# Patient Record
Sex: FEMALE | Race: WHITE
[De-identification: names, ages, dates, MRNs, and addresses within clinical notes are randomized per-mention and may not be internally consistent; named-entity substitution may affect disease eponyms.]

---

## 2019-08-30 ENCOUNTER — HOSPITAL ENCOUNTER (EMERGENCY)
Dept: HOSPITAL 96 - M.ERS | Age: 39
Discharge: HOME | End: 2019-08-30
Payer: COMMERCIAL

## 2019-08-30 VITALS — BODY MASS INDEX: 28.7 KG/M2 | HEIGHT: 71 IN | WEIGHT: 205.01 LBS

## 2019-08-30 VITALS — SYSTOLIC BLOOD PRESSURE: 123 MMHG | DIASTOLIC BLOOD PRESSURE: 77 MMHG

## 2019-08-30 DIAGNOSIS — R05: Primary | ICD-10-CM

## 2020-02-16 ENCOUNTER — HOSPITAL ENCOUNTER (INPATIENT)
Dept: HOSPITAL 96 - M.ERS | Age: 40
LOS: 3 days | Discharge: PSYCHIATRIC OR SUBSTANCE USE FACILITY | DRG: 917 | End: 2020-02-19
Attending: INTERNAL MEDICINE | Admitting: INTERNAL MEDICINE
Payer: COMMERCIAL

## 2020-02-16 VITALS — BODY MASS INDEX: 28.98 KG/M2 | HEIGHT: 71 IN | WEIGHT: 207 LBS

## 2020-02-16 VITALS — SYSTOLIC BLOOD PRESSURE: 141 MMHG | DIASTOLIC BLOOD PRESSURE: 58 MMHG

## 2020-02-16 VITALS — DIASTOLIC BLOOD PRESSURE: 97 MMHG | SYSTOLIC BLOOD PRESSURE: 147 MMHG

## 2020-02-16 DIAGNOSIS — R45.851: ICD-10-CM

## 2020-02-16 DIAGNOSIS — T42.6X2A: ICD-10-CM

## 2020-02-16 DIAGNOSIS — F17.210: ICD-10-CM

## 2020-02-16 DIAGNOSIS — F12.90: ICD-10-CM
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
## 2020-02-16 DIAGNOSIS — T42.4X2A: ICD-10-CM

## 2020-02-16 DIAGNOSIS — G92: ICD-10-CM

## 2020-02-16 DIAGNOSIS — F32.9: ICD-10-CM

## 2020-02-16 DIAGNOSIS — Z79.899: ICD-10-CM

## 2020-02-16 DIAGNOSIS — T39.1X2A: Primary | ICD-10-CM

## 2020-02-16 DIAGNOSIS — Z28.21: ICD-10-CM

## 2020-02-16 DIAGNOSIS — J69.0: ICD-10-CM

## 2020-02-16 LAB
ABSOLUTE BASOPHILS: 0 THOU/UL (ref 0–0.2)
ABSOLUTE EOSINOPHILS: 0 THOU/UL (ref 0–0.7)
ABSOLUTE MONOCYTES: 0.5 THOU/UL (ref 0–1.2)
ALBUMIN SERPL-MCNC: 4.1 G/DL (ref 3.4–5)
ALP SERPL-CCNC: 75 U/L (ref 46–116)
ALT SERPL-CCNC: 14 U/L (ref 30–65)
ANION GAP SERPL CALC-SCNC: 10 MMOL/L (ref 7–16)
APAP SERPL-MCNC: 109 UG/ML (ref 10–30)
APTT BLD: 24.5 SECONDS (ref 25–31.3)
AST SERPL-CCNC: 10 U/L (ref 15–37)
BASOPHILS NFR BLD AUTO: 0.3 %
BENZODIAZ UR-MCNC: POSITIVE UG/L
BILIRUB SERPL-MCNC: 0.5 MG/DL
BILIRUB UR-MCNC: NEGATIVE MG/DL
BUN SERPL-MCNC: 9 MG/DL (ref 7–18)
CALCIUM SERPL-MCNC: 8.5 MG/DL (ref 8.5–10.1)
CHLORIDE SERPL-SCNC: 104 MMOL/L (ref 98–107)
CO2 SERPL-SCNC: 25 MMOL/L (ref 21–32)
COLOR UR: YELLOW
CREAT SERPL-MCNC: 0.8 MG/DL (ref 0.6–1.3)
EOSINOPHIL NFR BLD: 0.4 %
ETHANOL SERPL-MCNC: < 10 MG/DL (ref ?–10)
GLUCOSE SERPL-MCNC: 90 MG/DL (ref 70–99)
GRANULOCYTES NFR BLD MANUAL: 84.6 %
HCT VFR BLD CALC: 42.4 % (ref 37–47)
HGB BLD-MCNC: 14.7 GM/DL (ref 12–15)
INR PPP: 1
KETONES UR STRIP-MCNC: NEGATIVE MG/DL
LYMPHOCYTES # BLD: 1.1 THOU/UL (ref 0.8–5.3)
LYMPHOCYTES NFR BLD AUTO: 10.4 %
MCH RBC QN AUTO: 32.2 PG (ref 26–34)
MCHC RBC AUTO-ENTMCNC: 34.7 G/DL (ref 28–37)
MCV RBC: 92.7 FL (ref 80–100)
MONOCYTES NFR BLD: 4.3 %
MPV: 9 FL. (ref 7.2–11.1)
NEUTROPHILS # BLD: 9.1 THOU/UL (ref 1.6–8.1)
NUCLEATED RBCS: 0 /100WBC
PLATELET COUNT*: 242 THOU/UL (ref 150–400)
POTASSIUM SERPL-SCNC: 4.5 MMOL/L (ref 3.5–5.1)
PROT SERPL-MCNC: 7.6 G/DL (ref 6.4–8.2)
PROT UR QL STRIP: (no result)
PROTHROMBIN TIME: 10 SECONDS (ref 9.2–11.5)
RBC # BLD AUTO: 4.57 MIL/UL (ref 4.2–5)
RBC # UR STRIP: NEGATIVE /UL
RDW-CV: 13.7 % (ref 10.5–14.5)
SALICYLATES SERPL-MCNC: 5.8 MG/DL (ref 2.8–20)
SODIUM SERPL-SCNC: 139 MMOL/L (ref 136–145)
SP GR UR STRIP: >= 1.03 (ref 1–1.03)
THC: POSITIVE
URINE CLARITY: CLEAR
URINE GLUCOSE-RANDOM: NEGATIVE
URINE LEUKOCYTES-REFLEX: NEGATIVE
URINE NITRITE-REFLEX: NEGATIVE
UROBILINOGEN UR STRIP-ACNC: 0.2 E.U./DL (ref 0.2–1)
WBC # BLD AUTO: 10.8 THOU/UL (ref 4–11)

## 2020-02-16 NOTE — CON
[address]  [address]                    CONSULTATION                  
_______________________________________________________________________________
 
Name:       ADAMSALEJANDRA M                 Room:           [address] [place]  
M.R.#:  [id number]      Account #:      [id number]  
Admission:  02/16/20     Attend Phys:    DENNIS Torres
Discharge:               Date of Birth:  [birth year]  
         Report #: [id number]
                                                                     [id number]  
_______________________________________________________________________________
THIS REPORT FOR:  //name//                      
 
cc:  RODRIGO Esparza family physician/PCP 
     RODRIGO Esparza family physician/PCP                                        ~
THIS REPORT FOR:  //name//                      
 
CC: Dr. Gant 
    [facility] physician/PCP
    Pee Arthur
 
DICTATED BY: Zakiya Mesa [facility]
 
DATE OF SERVICE:  02/17/2020
 
 
Please note at the time of this dictation, the patient was seen and physically
examined by myself.
 
REASON FOR CONSULTATION:  Tylenol overdose.
 
HISTORY OF PRESENT ILLNESS:  This is a 39-year-old female who presented to the
Emergency Room with noting that she had taken ten 1 mg lorazepam and three 1 mg
of Ambien that she mumbling to herself that she wanted to be closer to her
daughter who committed suicide on 12/18/2019.  The patient denied any alcohol
use that day, but has had a previous history of alcohol abuse and had been
attending AA.  She denied any overdose on Tylenol; however, when they did an
acetaminophen level on admission, she was 132 and she is continuing to go down. 
The patient is very vague in her responses and most of her H and P is obtained
from her chart.  The patient is very slurring of her words and hard to follow
and does fall asleep throughout.  The patient does mention that her PCP is Dr. Gant.
 
ALLERGIES:  No known drug allergies.
 
MEDICATIONS:  It appears medications from home have been clonazepam and Ambien
as well as some lorazepam.
 
PAST SURGICAL HISTORY:  Negative.
 
FAMILY HISTORY:  Negative.
 
SOCIAL HISTORY:  No recent but previous history of alcohol abuse.  The patient
has been going to .
 
REVIEW OF SYSTEMS:  A 12-point review of systems is essentially negative except
what is mentioned in the HPI.
 
 
 
[place] [address]                    CONSULTATION                  
_______________________________________________________________________________
 
Name:       LAMASALEJANDRA M                 Room:           [address] [place]  
[facility].#:  [id number]      Account #:      [id number]  
Admission:  02/16/20     Attend Phys:    DENNIS Torres
Discharge:               Date of Birth:  [birth year]  
         Report #: [id number]
                                                                     [id number]  
_______________________________________________________________________________
 
PHYSICAL EXAMINATION:
VITAL SIGNS:  Temperature 36.6, pulse 74, respirations 22, blood pressure
138/78.
HEART:  Regular rate and rhythm.
LUNGS:  Diminished, but clear.
ABDOMEN:  Soft, positive bowel sounds in all 4 quadrants with no masses or
tenderness noted.
 
LABORATORY DATA:  Hemoglobin is 14.7, white count is 10.8, platelets 242,000. 
PT 10, INR 1.0.  Total bilirubin 0.5, alkaline phosphatase is 75, ALT 14, AST is
10.  She was positive for benzos and acetaminophen and marijuana.
 
IMPRESSION:
1.  Intentional Tylenol overdose along with Ambien and lorazepam.
2.  Suicidal ideation.
 
PLAN:
1.  Continue her acetylcysteine.
2.  Monitor her LFTs.
3.  Further recommendations to be made once Dr. Rico sees the patient later
today.
 
Thank you for allowing us to participate in this patient's care.  Please do not
hesitate to call with any questions in regard to this consult.
 
 
 
 
 
 
 
 
 
 
 
 
 
 
 
 
 
 
 
                       
                                        By:                                
                 
D: 02/17/20 1248_______________________________________
T: 02/18/20 0500Roger Rico DO             /nt

## 2020-02-17 VITALS — DIASTOLIC BLOOD PRESSURE: 73 MMHG | SYSTOLIC BLOOD PRESSURE: 135 MMHG

## 2020-02-17 VITALS — SYSTOLIC BLOOD PRESSURE: 128 MMHG | DIASTOLIC BLOOD PRESSURE: 71 MMHG

## 2020-02-17 VITALS — DIASTOLIC BLOOD PRESSURE: 58 MMHG | SYSTOLIC BLOOD PRESSURE: 104 MMHG

## 2020-02-17 VITALS — SYSTOLIC BLOOD PRESSURE: 132 MMHG | DIASTOLIC BLOOD PRESSURE: 99 MMHG

## 2020-02-17 VITALS — SYSTOLIC BLOOD PRESSURE: 155 MMHG | DIASTOLIC BLOOD PRESSURE: 96 MMHG

## 2020-02-17 VITALS — SYSTOLIC BLOOD PRESSURE: 127 MMHG | DIASTOLIC BLOOD PRESSURE: 90 MMHG

## 2020-02-17 VITALS — SYSTOLIC BLOOD PRESSURE: 142 MMHG | DIASTOLIC BLOOD PRESSURE: 85 MMHG

## 2020-02-17 VITALS — DIASTOLIC BLOOD PRESSURE: 78 MMHG | SYSTOLIC BLOOD PRESSURE: 138 MMHG

## 2020-02-17 VITALS — SYSTOLIC BLOOD PRESSURE: 132 MMHG | DIASTOLIC BLOOD PRESSURE: 67 MMHG

## 2020-02-17 VITALS — SYSTOLIC BLOOD PRESSURE: 149 MMHG | DIASTOLIC BLOOD PRESSURE: 90 MMHG

## 2020-02-17 LAB
ALBUMIN SERPL-MCNC: 3.5 G/DL (ref 3.4–5)
ALP SERPL-CCNC: 63 U/L (ref 46–116)
ALT SERPL-CCNC: 12 U/L (ref 30–65)
ALT SERPL-CCNC: 13 U/L (ref 30–65)
ANION GAP SERPL CALC-SCNC: 14 MMOL/L (ref 7–16)
AST SERPL-CCNC: 6 U/L (ref 15–37)
AST SERPL-CCNC: 7 U/L (ref 15–37)
BILIRUB DIRECT SERPL-MCNC: 0.1 MG/DL
BILIRUB SERPL-MCNC: 0.7 MG/DL
BUN SERPL-MCNC: 6 MG/DL (ref 7–18)
CALCIUM SERPL-MCNC: 8.4 MG/DL (ref 8.5–10.1)
CHLORIDE SERPL-SCNC: 107 MMOL/L (ref 98–107)
CO2 SERPL-SCNC: 20 MMOL/L (ref 21–32)
CREAT SERPL-MCNC: 0.7 MG/DL (ref 0.6–1.3)
GLUCOSE SERPL-MCNC: 87 MG/DL (ref 70–99)
HCT VFR BLD CALC: 39.7 % (ref 37–47)
HGB BLD-MCNC: 13.6 GM/DL (ref 12–15)
INR PPP: 1.1
POTASSIUM SERPL-SCNC: 3.4 MMOL/L (ref 3.5–5.1)
PROT SERPL-MCNC: 6.9 G/DL (ref 6.4–8.2)
PROTHROMBIN TIME: 11.4 SECONDS (ref 9.2–11.5)
SODIUM SERPL-SCNC: 141 MMOL/L (ref 136–145)

## 2020-02-17 NOTE — NUR
RECEIEVIED REPORT FROM SHELDON RN IN ICU OF EXPECTED TRANSFER- DX: SI, TYLENOL
OVERDOSE- PT ARRIED TO ROOM 226 VIA W/C PER TECH AT 1300- CARDIAC MONITOR
PLACED AS ORDERED, TRACING SR WITH 1ST DEGREE-ROOM PREPED AS INDICATED AND
SITTER AT SIDE-PT NOTED TO BE A&O X3-4, WITH SLURRED SPEACH- PRIOR ASSESSMENT
REVIEWED AND THIS NURSE AGREES- IV NOTED TO LEFT AC AND LEFT WRIST INTACT, IV
ACETYLCYSTEINE INFUSSING AS PRESICBED- PRESCIBED ZOYSN GIVEN AS PRESCIBED AS
WELL- PT NOTED TO BE AGGRUMENTATIVE AT TIMES AND QUESTIONING IF ANYONE HAS
EVER ESCAPED FROM HERE BEFORE- EXPRESSES CONCERNS FOR LOSING JOB IF SHE
DOSEN'T GET OUT OF HERE- PT THEN LATER NOTED TO PULL OUT LEFT AC IV, STATING
AGAIN THAT SHE WANTED TO LEAVE- CALL LIGHT AND PERSONAL BELONGINGS WITH IN
REACH- HOURLY ROUNDS IN PLACE R/T SAFETY/NEEDS- ALL NEEDS MET AT THIS
TIME-WCTM

## 2020-02-17 NOTE — NUR
RECEIVED REPORT AND ASSUMED CARE AT 2320. PT TRANSPORTED FROM ED TO ICU BED
02. VSS. ICU MONITORING IN PLACEE. ASSESSMENT AND ADMISSION COMPLETED AS
CHARTED. PT IS ABLE TO FOLLOW SOME VERBAL COOMANDS, RESPONDS TO TOUCH AND
NAME. NOT RESPONDING VERBALLY, UNABLE TO TRACE WITH EYES. PT HAS 1:1 FOR
SAFETY. ON ACETAMINOPHEN OVERDOSE POTOCOL. PT VOMITING UPON ARRIVAL. PRN
MEDICATION PER ORDERS.
LATER WHEN ABLE TO RESPOND. PT REPORTED THIS WAS JUST A "ONE TIME ATTEMPT". PT
DOES NOT REMEMBER TAKING ANY ACETAMINOPHEN. PICS TAKEN OF NOTES WRITTE ON PT
ARM SAYS  "2020 JAN 16 IM DONE" AND SIGNED HER NAME AND UNDER THAT "IM A DNR"
BED LOCKED IN LOWEST POSITION, CALL LIGHT WITHIN REACH.BED ALARM ON.

## 2020-02-17 NOTE — EKG
[place] [address]
Phone:  [phone]                     ELECTROCARDIOGRAM REPORT      
_______________________________________________________________________________
 
Name:         ALEJANDRA LAMAS                Room:          [address] [place] 
..#:    [id number]     Account #:     [id number]  
Admission:    20    Attend Phys:   Pee Arthur
Discharge:                Date of Birth: [birth year]  
Date of Service: 20  Report #:      [id number]
        [id number]
_______________________________________________________________________________
THIS REPORT FOR:  //name//                      
 
                         [facility] ED
                                       
Test Date:    2020               Test Time:    20:07:43
Pat Name:     ALEJANDRA LAMAS             Department:   
Patient ID:   [id number]            Room:         [facility]
Gender:       F                        Technician:   MR
:          1980               Requested By: José Manuel Barnes
Order Number: [id number] MD:   Lambert Link
                                 Measurements
Intervals                              Axis          
Rate:         61                       P:            62
VT:           194                      QRS:          44
QRSD:         109                      T:            37
QT:           438                                    
QTc:          442                                    
                           Interpretive Statements
Sinus arrhythmia
RSR' in V1 or V2, right VCD 
No previous ECG available for comparison
 
Electronically Signed On 2020 16:46:30 CST by Lambert Link
[email or web address]
 
 
 
 
 
 
 
 
 
 
 
 
 
 
 
 
 
 
 
 
  <ELECTRONICALLY SIGNED>
                                           By: Lambert Link MD, [facility]     
  20     1646
D: 20   _____________________________________
T: 20   Lambert Link MD, [facility]       /EPI

## 2020-02-17 NOTE — NUR
PT TRANFERED TO ROOM 226. REPORT GIVEN TO RAFA SANTIAGO. PT BECAME UPSET AND
ARGUMENTIVE R/T ROOM CHANGE. BELIEVED "DR CORTEZ" WAS SENDING HER TO THE "PSYCH
FLOOR". EXPLAINED TO PT THAT SHE WAS LEAVING ICU TO GO TO A DIFFERENT LEVEL OF
CARE AND THAT IT WAS NOT A PSYCH FLOOR. CHELSEY GONSALEZ, AND NELLIE FROM [facility]
ESCORTED PT TO ROOM 226. NO PERSONAL BELONGINGS FOUND ON UNIT. PT HAS
BELONGINGS IN [facility].

## 2020-02-18 VITALS — DIASTOLIC BLOOD PRESSURE: 68 MMHG | SYSTOLIC BLOOD PRESSURE: 112 MMHG

## 2020-02-18 VITALS — DIASTOLIC BLOOD PRESSURE: 53 MMHG | SYSTOLIC BLOOD PRESSURE: 112 MMHG

## 2020-02-18 VITALS — SYSTOLIC BLOOD PRESSURE: 105 MMHG | DIASTOLIC BLOOD PRESSURE: 55 MMHG

## 2020-02-18 VITALS — DIASTOLIC BLOOD PRESSURE: 61 MMHG | SYSTOLIC BLOOD PRESSURE: 115 MMHG

## 2020-02-18 VITALS — SYSTOLIC BLOOD PRESSURE: 121 MMHG | DIASTOLIC BLOOD PRESSURE: 78 MMHG

## 2020-02-18 VITALS — SYSTOLIC BLOOD PRESSURE: 113 MMHG | DIASTOLIC BLOOD PRESSURE: 60 MMHG

## 2020-02-18 LAB
ABSOLUTE BASOPHILS: 0 THOU/UL (ref 0–0.2)
ABSOLUTE EOSINOPHILS: 0.2 THOU/UL (ref 0–0.7)
ABSOLUTE MONOCYTES: 0.4 THOU/UL (ref 0–1.2)
ALBUMIN SERPL-MCNC: 3.4 G/DL (ref 3.4–5)
ALP SERPL-CCNC: 62 U/L (ref 46–116)
ALT SERPL-CCNC: 14 U/L (ref 30–65)
ANION GAP SERPL CALC-SCNC: 14 MMOL/L (ref 7–16)
AST SERPL-CCNC: 8 U/L (ref 15–37)
BASOPHILS NFR BLD AUTO: 0.5 %
BILIRUB SERPL-MCNC: 0.6 MG/DL
BUN SERPL-MCNC: 6 MG/DL (ref 7–18)
CALCIUM SERPL-MCNC: 8 MG/DL (ref 8.5–10.1)
CHLORIDE SERPL-SCNC: 107 MMOL/L (ref 98–107)
CO2 SERPL-SCNC: 20 MMOL/L (ref 21–32)
CREAT SERPL-MCNC: 0.7 MG/DL (ref 0.6–1.3)
EOSINOPHIL NFR BLD: 1.9 %
GLUCOSE SERPL-MCNC: 70 MG/DL (ref 70–99)
GRANULOCYTES NFR BLD MANUAL: 73.7 %
HCT VFR BLD CALC: 39.6 % (ref 37–47)
HGB BLD-MCNC: 13.6 GM/DL (ref 12–15)
INR PPP: 1.1
LYMPHOCYTES # BLD: 1.8 THOU/UL (ref 0.8–5.3)
LYMPHOCYTES NFR BLD AUTO: 19.3 %
MCH RBC QN AUTO: 31.9 PG (ref 26–34)
MCHC RBC AUTO-ENTMCNC: 34.3 G/DL (ref 28–37)
MCV RBC: 93 FL (ref 80–100)
MONOCYTES NFR BLD: 4.6 %
MPV: 9 FL. (ref 7.2–11.1)
NEUTROPHILS # BLD: 6.9 THOU/UL (ref 1.6–8.1)
NUCLEATED RBCS: 0 /100WBC
PLATELET COUNT*: 200 THOU/UL (ref 150–400)
POTASSIUM SERPL-SCNC: 3.2 MMOL/L (ref 3.5–5.1)
PROT SERPL-MCNC: 6.8 G/DL (ref 6.4–8.2)
PROTHROMBIN TIME: 11.4 SECONDS (ref 9.2–11.5)
RBC # BLD AUTO: 4.26 MIL/UL (ref 4.2–5)
RDW-CV: 13.5 % (ref 10.5–14.5)
SODIUM SERPL-SCNC: 141 MMOL/L (ref 136–145)
WBC # BLD AUTO: 9.3 THOU/UL (ref 4–11)

## 2020-02-18 NOTE — NUR
MET WITH PT TODAY AFTER PSYCH CONSULT.  PER PT, NEEDED TO MAKE APPTS WITH HER
DRS AND COULD LEAVE TODAY OR TOMORROW.   READ PSYCH CONSULT WITH NURSING AND
THEY CALLED TO DISCUSS WITH TELE PSYCH .  JAIMIE ALSO SPOKE WITH DR, SHE
INTERPRETTED CONSULT AND WAS MADE AWARE THAT CM ABLE TO ASSIST PT WITH
SECURING APPTS.  NOT ABLE TO COMPLETE RECOMMENED MED RECONSILIATION AS PT'S
PCP HAS BEEN PRESCRIBING MEDS PER PT.  THIS TELEPSYCH DR TO DO A 2ND EVAL AS
PER 1ST RECOMMENDATION AND THE RESULT OF THAT IS FOR INPT PSYCH. UPDATED DR CHACKO ON BOTH RESULTS.  HE IS AGREEABLE TO INPT PSYCH TRANSFER ONLY.
CALLED AND FAXED REFERRALS WITH AFFADVAITS TO [facility],
[facility], [facility] AND [facility].
THE FOLLOWING HAD NO BEDS AND WOULDN'T TAKE REFERRAL:
[facility]
TAYLA
[facility] IN [facility]
ALL REFERRALS TO CALL THE NURSING UNIT BACK WITH ACCEPTANCE/DENIAL

## 2020-02-18 NOTE — NUR
ASSUMED CARE OF PT THIS AM AROUND 0715- CARDIAC MONITOR IN PLACE AS ORDERED,
TRACING SR- UPON ASSESSMENT PT NOTED TO BE RESTING IN BED, EYES CLOSED- SITTER
AT SIDE AS INDICATED- PT A&O X4, DROWSY- CONT OF B/B- SBA WITH TRANSFERS-
COURSE LUNG SOUNDS WITH WET PRODUCTIVE COUGH- VSS, O2 SAT 93% ON RA- ABD
SOFT/ROUND/NON-TENDER, BS X4 QUADS-LAST BM REPORTED 2/17/20- IV NOTED TO LEFT
FA INTACT WITH IV ABT INFUSSING THIS AM AS PRESCIBED, 2ND IV NOTED TO LEFT
WRIST INTACT AND SL- PT DENIES ANY C/O PAIN/DISCOMFORT THIS AM- CALL LIGHT AND
PERSONAL BELONGINGS WITH IN REACH- ALL NEEDS MET AT THIS TIME-WCTM

## 2020-02-18 NOTE — NUR
PATIENT HAS SLEPT OFF AND ON DURING THE NIGHT. VSS ON RA. SITTER IN ROOM WITH
PATIENT. PATIENT REMAINS ON SUICIDE WATCH. NO C/O PAIN. MEDICATIONS GIVEN AS
ORDERED AND CHARTED. POISON CONTROL CALLED TO CHECK ON PATIENTS LABS AND
INQUIRE ABOUT PATIENT STATUS. PATIENT IS SINUS RHYTHM ON TELE MONITOR. IV IN
LEFT FOREARM-SL. IV IN LEFT WRIST-SL. IV ABT GIVEN WITHOUT ANY ADVERSE SIDE
EFFECTS NOTED. HOURLY ROUNDS MADE. WILL CONTINUE WITH PLAN OF CARE AND NURSING
TO MONITOR.

## 2020-02-18 NOTE — NUR
LATE ENTRY FOR 2/17 1015: ICU ROUNDS: PT.TOOK UNKNOWN AMOUNTS OF TYLENOL,
AMBIEN AND KLONOPIN. TYLENOL LEVEL ELEVATED. UDS + FOR BENZOS AND THC. PT.WITH
SLURRED SPEECH. NURSING ADVISED CM AGAINST SEE PT.AS SHE WAS BECOMING
AGITATED, AT THIS TIME.

## 2020-02-19 VITALS — DIASTOLIC BLOOD PRESSURE: 63 MMHG | SYSTOLIC BLOOD PRESSURE: 117 MMHG

## 2020-02-19 VITALS — SYSTOLIC BLOOD PRESSURE: 116 MMHG | DIASTOLIC BLOOD PRESSURE: 69 MMHG

## 2020-02-19 VITALS — SYSTOLIC BLOOD PRESSURE: 117 MMHG | DIASTOLIC BLOOD PRESSURE: 63 MMHG

## 2020-02-19 NOTE — NUR
FOLLOWED UP WITH [facility] AT APPROX 0200. THEY ARE REQUESTING AN
UPDATED AFFADAVIT FROM THE PHYSICIAN FROM 2/18/20 OR NEWER.  UNABLE TO OBTAIN
AT THIS TIME DUE TO PHYSICIAN NOT BEING IN HOUSE TO WRITE ONE. HOUSE
SUPERVISOR UP TO DATE AND CASE MANAGEMENT TO BE UPDATED AS WELL. CALL LIGHT
WITHIN REACH

## 2020-02-19 NOTE — NUR
ASSUMED CARE OF PT THIS AM AROUND 0715- M/S STATUS IN PLACE AND MAINTAINED AS
INDICCATED- PT A&O X4- CONT OF B/B- UP SBA FOR SAFETY- SITTER AT SIDE AS
INDCIATED R/T SI- LCTA, RESP EVEN ADND [id number] PRODUCTIVE COUGH-
VSS, O2 SAT 98% ON RA-ABD SOFT/ROUND/NON-TENDER, BS X4 QUADS-LAST BM REPORTED
2/18/20- IV NOTED TO LEFT FA AND LEFT AC INTACT AND SL- AWAITING INPT PYCH
PLACEMENT AT THIS TIME- DENIES ANY C/O PAIN/DISCOMFORT AT THIS TIME- MAKES
NEEDS KNOWN- ALL NEEDS MET AT THIS TIME-WCTM

## 2020-02-19 NOTE — NUR
STILL WAITING ON INPT PSYCH PLACEMENT. [facility] CALLED NURSING AT
APPROX 2000 TO ENSURE PATIENT STILL NEEDED A BED. REASSURED THEM THAT
PATIENT WAS STILL HERE AND NEEDING PLACEMENT. THEY STATED THEY WERE REACHING
OUT TO PSYCHIATRIST TO INQUIRE ABOUT ACCEPTANCE.  WAS TOLD AT THIS TIME THAT
THEY WOULD RETURN THE PHONE CALL IF THERE WAS AN ACCEPTING PHYSICIAN TONIGHT.
NO CALL BACK AT THIS TIME.

## 2020-02-19 NOTE — NUR
PER SUPERVISOR, [facility] CALLED BACK AND NEEDS UPDATED AFFADAVITS
FROM JAIMIE AND .  CALL TO [facility], HE CONFIRMED THIS.  WILL
DISCUSS WITH DR BENNETT TO [facility], THEY DECLINED D/T CAPABILITY
CALL TO [facility], THEY MAY HAVE BEDS TODAY AND ASKED THAT REFERRAL BE RE-FAXED,
DONE